# Patient Record
Sex: FEMALE | Race: WHITE | Employment: OTHER | URBAN - METROPOLITAN AREA
[De-identification: names, ages, dates, MRNs, and addresses within clinical notes are randomized per-mention and may not be internally consistent; named-entity substitution may affect disease eponyms.]

---

## 2017-03-24 ENCOUNTER — HOSPITAL ENCOUNTER (OUTPATIENT)
Dept: PREADMISSION TESTING | Age: 82
Discharge: HOME | End: 2017-03-24
Payer: MEDICARE

## 2017-03-24 ENCOUNTER — HOSPITAL ENCOUNTER (OUTPATIENT)
Dept: GENERAL RADIOLOGY | Age: 82
Discharge: HOME | End: 2017-03-24
Payer: MEDICARE

## 2017-03-24 DIAGNOSIS — Z01.818 PRE-OP EVALUATION: ICD-10-CM

## 2017-03-24 DIAGNOSIS — N28.89 OTHER SPECIFIED DISORDER OF KIDNEY AND URETER: ICD-10-CM

## 2017-03-24 LAB
ANION GAP BLD CALC-SCNC: 7 MMOL/L (ref 3–18)
APTT PPP: 27.7 SEC (ref 23–36.4)
BASOPHILS # BLD AUTO: 0 K/UL (ref 0–0.1)
BASOPHILS # BLD: 1 % (ref 0–2)
BUN SERPL-MCNC: 10 MG/DL (ref 7–18)
BUN/CREAT SERPL: 11 (ref 12–20)
CALCIUM SERPL-MCNC: 9.1 MG/DL (ref 8.5–10.1)
CHLORIDE SERPL-SCNC: 103 MMOL/L (ref 100–108)
CO2 SERPL-SCNC: 31 MMOL/L (ref 21–32)
CREAT SERPL-MCNC: 0.87 MG/DL (ref 0.6–1.3)
DIFFERENTIAL METHOD BLD: NORMAL
EOSINOPHIL # BLD: 0.1 K/UL (ref 0–0.4)
EOSINOPHIL NFR BLD: 1 % (ref 0–5)
ERYTHROCYTE [DISTWIDTH] IN BLOOD BY AUTOMATED COUNT: 12.4 % (ref 11.6–14.5)
GLUCOSE SERPL-MCNC: 89 MG/DL (ref 74–99)
HCT VFR BLD AUTO: 42.1 % (ref 35–45)
HGB BLD-MCNC: 14 G/DL (ref 12–16)
INR PPP: 1 (ref 0.8–1.2)
LYMPHOCYTES # BLD AUTO: 24 % (ref 21–52)
LYMPHOCYTES # BLD: 1.6 K/UL (ref 0.9–3.6)
MCH RBC QN AUTO: 30.6 PG (ref 24–34)
MCHC RBC AUTO-ENTMCNC: 33.3 G/DL (ref 31–37)
MCV RBC AUTO: 91.9 FL (ref 74–97)
MONOCYTES # BLD: 0.4 K/UL (ref 0.05–1.2)
MONOCYTES NFR BLD AUTO: 6 % (ref 3–10)
NEUTS SEG # BLD: 4.7 K/UL (ref 1.8–8)
NEUTS SEG NFR BLD AUTO: 68 % (ref 40–73)
PLATELET # BLD AUTO: 230 K/UL (ref 135–420)
PMV BLD AUTO: 10.1 FL (ref 9.2–11.8)
POTASSIUM SERPL-SCNC: 4.3 MMOL/L (ref 3.5–5.5)
PROTHROMBIN TIME: 12.9 SEC (ref 11.5–15.2)
RBC # BLD AUTO: 4.58 M/UL (ref 4.2–5.3)
SODIUM SERPL-SCNC: 141 MMOL/L (ref 136–145)
WBC # BLD AUTO: 6.8 K/UL (ref 4.6–13.2)

## 2017-03-24 PROCEDURE — 85025 COMPLETE CBC W/AUTO DIFF WBC: CPT | Performed by: UROLOGY

## 2017-03-24 PROCEDURE — 85610 PROTHROMBIN TIME: CPT | Performed by: UROLOGY

## 2017-03-24 PROCEDURE — 85730 THROMBOPLASTIN TIME PARTIAL: CPT | Performed by: UROLOGY

## 2017-03-24 PROCEDURE — 93005 ELECTROCARDIOGRAM TRACING: CPT

## 2017-03-24 PROCEDURE — 36415 COLL VENOUS BLD VENIPUNCTURE: CPT | Performed by: UROLOGY

## 2017-03-24 PROCEDURE — 80048 BASIC METABOLIC PNL TOTAL CA: CPT | Performed by: UROLOGY

## 2017-03-24 PROCEDURE — 71020 XR CHEST PA LAT: CPT

## 2017-03-25 LAB
ATRIAL RATE: 62 BPM
CALCULATED P AXIS, ECG09: 67 DEGREES
CALCULATED R AXIS, ECG10: 44 DEGREES
CALCULATED T AXIS, ECG11: 59 DEGREES
DIAGNOSIS, 93000: NORMAL
P-R INTERVAL, ECG05: 168 MS
Q-T INTERVAL, ECG07: 436 MS
QRS DURATION, ECG06: 86 MS
QTC CALCULATION (BEZET), ECG08: 442 MS
VENTRICULAR RATE, ECG03: 62 BPM

## 2017-03-30 RX ORDER — CIPROFLOXACIN 2 MG/ML
400 INJECTION, SOLUTION INTRAVENOUS ONCE
Status: CANCELLED | OUTPATIENT
Start: 2017-03-30 | End: 2017-03-30

## 2017-04-01 ENCOUNTER — ANESTHESIA EVENT (OUTPATIENT)
Dept: SURGERY | Age: 82
End: 2017-04-01
Payer: MEDICARE

## 2017-04-03 ENCOUNTER — ANESTHESIA (OUTPATIENT)
Dept: SURGERY | Age: 82
End: 2017-04-03
Payer: MEDICARE

## 2017-04-03 ENCOUNTER — APPOINTMENT (OUTPATIENT)
Dept: GENERAL RADIOLOGY | Age: 82
End: 2017-04-03
Attending: UROLOGY
Payer: MEDICARE

## 2017-04-03 ENCOUNTER — HOSPITAL ENCOUNTER (OUTPATIENT)
Age: 82
Setting detail: OUTPATIENT SURGERY
Discharge: HOME | End: 2017-04-03
Attending: UROLOGY | Admitting: UROLOGY
Payer: MEDICARE

## 2017-04-03 VITALS
HEART RATE: 75 BPM | WEIGHT: 150 LBS | DIASTOLIC BLOOD PRESSURE: 69 MMHG | OXYGEN SATURATION: 97 % | BODY MASS INDEX: 30.24 KG/M2 | SYSTOLIC BLOOD PRESSURE: 129 MMHG | TEMPERATURE: 96.9 F | RESPIRATION RATE: 14 BRPM | HEIGHT: 59 IN

## 2017-04-03 PROCEDURE — 88305 TISSUE EXAM BY PATHOLOGIST: CPT | Performed by: UROLOGY

## 2017-04-03 PROCEDURE — 77030020782 HC GWN BAIR PAWS FLX 3M -B: Performed by: UROLOGY

## 2017-04-03 PROCEDURE — 77030020269 HC MISC IMPL: Performed by: UROLOGY

## 2017-04-03 PROCEDURE — 77030010509 HC AIRWY LMA MSK TELE -A: Performed by: NURSE ANESTHETIST, CERTIFIED REGISTERED

## 2017-04-03 PROCEDURE — 77030012012 HC AIRWY OP SFT TELE -A: Performed by: NURSE ANESTHETIST, CERTIFIED REGISTERED

## 2017-04-03 PROCEDURE — C1894 INTRO/SHEATH, NON-LASER: HCPCS | Performed by: UROLOGY

## 2017-04-03 PROCEDURE — 74011250636 HC RX REV CODE- 250/636: Performed by: UROLOGY

## 2017-04-03 PROCEDURE — 82360 CALCULUS ASSAY QUANT: CPT | Performed by: UROLOGY

## 2017-04-03 PROCEDURE — C1769 GUIDE WIRE: HCPCS | Performed by: UROLOGY

## 2017-04-03 PROCEDURE — 74011250636 HC RX REV CODE- 250/636: Performed by: ANESTHESIOLOGY

## 2017-04-03 PROCEDURE — 74011250636 HC RX REV CODE- 250/636

## 2017-04-03 PROCEDURE — 77030032490 HC SLV COMPR SCD KNE COVD -B: Performed by: UROLOGY

## 2017-04-03 PROCEDURE — 77030025764 HC FCPS BIOP ENDOSC BSC -D: Performed by: UROLOGY

## 2017-04-03 PROCEDURE — 77030018846 HC SOL IRR STRL H20 ICUM -A: Performed by: UROLOGY

## 2017-04-03 PROCEDURE — 77030006974 HC BSKT URET RTVR BSC -C: Performed by: UROLOGY

## 2017-04-03 PROCEDURE — 76010000162 HC OR TIME 1.5 TO 2 HR INTENSV-TIER 1: Performed by: UROLOGY

## 2017-04-03 PROCEDURE — 76060000034 HC ANESTHESIA 1.5 TO 2 HR: Performed by: UROLOGY

## 2017-04-03 PROCEDURE — 74011250637 HC RX REV CODE- 250/637: Performed by: ANESTHESIOLOGY

## 2017-04-03 PROCEDURE — 76210000021 HC REC RM PH II 0.5 TO 1 HR: Performed by: UROLOGY

## 2017-04-03 PROCEDURE — 88300 SURGICAL PATH GROSS: CPT | Performed by: UROLOGY

## 2017-04-03 PROCEDURE — 74011000258 HC RX REV CODE- 258: Performed by: UROLOGY

## 2017-04-03 PROCEDURE — 74011000250 HC RX REV CODE- 250

## 2017-04-03 PROCEDURE — C1758 CATHETER, URETERAL: HCPCS | Performed by: UROLOGY

## 2017-04-03 PROCEDURE — 77030012961 HC IRR KT CYSTO/TUR ICUM -A: Performed by: UROLOGY

## 2017-04-03 PROCEDURE — 77030018836 HC SOL IRR NACL ICUM -A: Performed by: UROLOGY

## 2017-04-03 PROCEDURE — 76210000006 HC OR PH I REC 0.5 TO 1 HR: Performed by: UROLOGY

## 2017-04-03 PROCEDURE — 74011636320 HC RX REV CODE- 636/320: Performed by: UROLOGY

## 2017-04-03 PROCEDURE — 74420 UROGRAPHY RTRGR +-KUB: CPT

## 2017-04-03 RX ORDER — EPHEDRINE SULFATE/0.9% NACL/PF 25 MG/5 ML
SYRINGE (ML) INTRAVENOUS AS NEEDED
Status: DISCONTINUED | OUTPATIENT
Start: 2017-04-03 | End: 2017-04-03 | Stop reason: HOSPADM

## 2017-04-03 RX ORDER — SODIUM CHLORIDE 0.9 % (FLUSH) 0.9 %
5-10 SYRINGE (ML) INJECTION AS NEEDED
Status: DISCONTINUED | OUTPATIENT
Start: 2017-04-03 | End: 2017-04-03 | Stop reason: HOSPADM

## 2017-04-03 RX ORDER — FENTANYL CITRATE 50 UG/ML
INJECTION, SOLUTION INTRAMUSCULAR; INTRAVENOUS AS NEEDED
Status: DISCONTINUED | OUTPATIENT
Start: 2017-04-03 | End: 2017-04-03 | Stop reason: HOSPADM

## 2017-04-03 RX ORDER — FENTANYL CITRATE 50 UG/ML
25 INJECTION, SOLUTION INTRAMUSCULAR; INTRAVENOUS
Status: DISCONTINUED | OUTPATIENT
Start: 2017-04-03 | End: 2017-04-03 | Stop reason: HOSPADM

## 2017-04-03 RX ORDER — CIPROFLOXACIN 2 MG/ML
400 INJECTION, SOLUTION INTRAVENOUS ONCE
Status: COMPLETED | OUTPATIENT
Start: 2017-04-03 | End: 2017-04-03

## 2017-04-03 RX ORDER — HYDROMORPHONE HYDROCHLORIDE 2 MG/1
2 TABLET ORAL
Qty: 20 TAB | Refills: 0 | Status: SHIPPED | OUTPATIENT
Start: 2017-04-03 | End: 2017-04-18

## 2017-04-03 RX ORDER — LIDOCAINE HYDROCHLORIDE 20 MG/ML
INJECTION, SOLUTION EPIDURAL; INFILTRATION; INTRACAUDAL; PERINEURAL AS NEEDED
Status: DISCONTINUED | OUTPATIENT
Start: 2017-04-03 | End: 2017-04-03 | Stop reason: HOSPADM

## 2017-04-03 RX ORDER — CIPROFLOXACIN 2 MG/ML
400 INJECTION, SOLUTION INTRAVENOUS ONCE
Status: DISCONTINUED | OUTPATIENT
Start: 2017-04-03 | End: 2017-04-03 | Stop reason: HOSPADM

## 2017-04-03 RX ORDER — ONDANSETRON 2 MG/ML
INJECTION INTRAMUSCULAR; INTRAVENOUS AS NEEDED
Status: DISCONTINUED | OUTPATIENT
Start: 2017-04-03 | End: 2017-04-03 | Stop reason: HOSPADM

## 2017-04-03 RX ORDER — PROPOFOL 10 MG/ML
INJECTION, EMULSION INTRAVENOUS AS NEEDED
Status: DISCONTINUED | OUTPATIENT
Start: 2017-04-03 | End: 2017-04-03 | Stop reason: HOSPADM

## 2017-04-03 RX ORDER — FAMOTIDINE 20 MG/1
20 TABLET, FILM COATED ORAL ONCE
Status: COMPLETED | OUTPATIENT
Start: 2017-04-03 | End: 2017-04-03

## 2017-04-03 RX ORDER — SODIUM CHLORIDE, SODIUM LACTATE, POTASSIUM CHLORIDE, CALCIUM CHLORIDE 600; 310; 30; 20 MG/100ML; MG/100ML; MG/100ML; MG/100ML
75 INJECTION, SOLUTION INTRAVENOUS CONTINUOUS
Status: DISCONTINUED | OUTPATIENT
Start: 2017-04-03 | End: 2017-04-03 | Stop reason: HOSPADM

## 2017-04-03 RX ORDER — CIPROFLOXACIN 250 MG/1
250 TABLET, FILM COATED ORAL 2 TIMES DAILY
Qty: 6 TAB | Refills: 0 | Status: SHIPPED | OUTPATIENT
Start: 2017-04-03 | End: 2017-04-18

## 2017-04-03 RX ORDER — HYDROMORPHONE HYDROCHLORIDE 2 MG/1
1 TABLET ORAL
Status: DISCONTINUED | OUTPATIENT
Start: 2017-04-03 | End: 2017-04-03 | Stop reason: HOSPADM

## 2017-04-03 RX ADMIN — PROPOFOL 50 MG: 10 INJECTION, EMULSION INTRAVENOUS at 07:45

## 2017-04-03 RX ADMIN — GENTAMICIN SULFATE 160 MG: 40 INJECTION, SOLUTION INTRAMUSCULAR; INTRAVENOUS at 07:38

## 2017-04-03 RX ADMIN — PROPOFOL 150 MG: 10 INJECTION, EMULSION INTRAVENOUS at 07:43

## 2017-04-03 RX ADMIN — CIPROFLOXACIN 400 MG: 2 INJECTION, SOLUTION INTRAVENOUS at 07:59

## 2017-04-03 RX ADMIN — FENTANYL CITRATE 50 MCG: 50 INJECTION, SOLUTION INTRAMUSCULAR; INTRAVENOUS at 07:49

## 2017-04-03 RX ADMIN — SODIUM CHLORIDE, SODIUM LACTATE, POTASSIUM CHLORIDE, AND CALCIUM CHLORIDE 75 ML/HR: 600; 310; 30; 20 INJECTION, SOLUTION INTRAVENOUS at 06:40

## 2017-04-03 RX ADMIN — LIDOCAINE HYDROCHLORIDE 60 MG: 20 INJECTION, SOLUTION EPIDURAL; INFILTRATION; INTRACAUDAL; PERINEURAL at 07:43

## 2017-04-03 RX ADMIN — Medication 5 MG: at 08:47

## 2017-04-03 RX ADMIN — ONDANSETRON 4 MG: 2 INJECTION INTRAMUSCULAR; INTRAVENOUS at 07:50

## 2017-04-03 RX ADMIN — FAMOTIDINE 20 MG: 20 TABLET, FILM COATED ORAL at 06:50

## 2017-04-03 NOTE — DISCHARGE INSTRUCTIONS
Cystoscopy: What to Expect at [address] [facility]    A cystoscopy is a procedure that lets a doctor look inside of the bladder and the urethra. The urethra is the tube that carries urine from the bladder to outside the body. The doctor uses a thin, lighted tool called a cystoscope. Your bladder is filled with fluid. This stretches the bladder so that your doctor can look closely at the inside of your bladder. After the cystoscopy, your urethra may be sore at first, and it may burn when you urinate for the first few days after the procedure. You may feel the need to urinate more often, and your urine may be pink. These symptoms should get better in 1 or 2 days. You will probably be able to go back to most of your usual activities in 1 or 2 days. This care sheet gives you a general idea about how long it will take for you to recover. But each person recovers at a different pace. Follow the steps below to get better as quickly as possible. How can you care for yourself at home? Activity  · Rest when you feel tired. Getting enough sleep will help you recover. · Try to walk each day. Start by walking a little more than you did the day before. Bit by bit, increase the amount you walk. Walking boosts blood flow and helps prevent pneumonia and constipation. · Avoid strenuous activities, such as bicycle riding, jogging, weight lifting, or aerobic exercise, until your doctor says it is okay. · Ask your doctor when you can drive again. · Most people are able to return to work within 1 or 2 days after the procedure. · You may shower and take baths as usual.  · Ask your doctor when it is okay for you to have sex. Diet  · You can eat your normal diet. If your stomach is upset, try bland, low-fat foods like plain rice, broiled chicken, toast, and yogurt. · Drink plenty of fluids (unless your doctor tells you not to). Medicines  · Take pain medicines exactly as directed.   ¨ If the doctor gave you a prescription medicine for pain, take it as prescribed. ¨ If you are not taking a prescription pain medicine, ask your doctor if you can take an over-the-counter medicine. · If you think your pain medicine is making you sick to your stomach:  ¨ Take your medicine after meals (unless your doctor has told you not to). ¨ Ask your doctor for a different pain medicine. · If your doctor prescribed antibiotics, take them as directed. Do not stop taking them just because you feel better. You need to take the full course of antibiotics. Follow-up care is a key part of your treatment and safety. Be sure to make and go to all appointments, and call your doctor if you are having problems. It's also a good idea to know your test results and keep a list of the medicines you take. When should you call for help? Call 911 anytime you think you may need emergency care. For example, call if:  · You passed out (lost consciousness). · You have severe trouble breathing. · You have sudden chest pain and shortness of breath, or you cough up blood. · You have severe belly pain. Call your doctor now or seek immediate medical care if:  · You are sick to your stomach or cannot keep fluids down. · Your urine is still red or you see blood clots after you have urinated several times. · You have trouble passing urine or stool, especially if you have pain or swelling in your lower belly. · You have signs of a blood clot, such as:  ¨ Pain in your calf, back of the knee, thigh, or groin. ¨ Redness and swelling in your leg or groin. · You develop a fever or severe chills. · You have pain in your back just below your rib cage. This is called flank pain. Watch closely for changes in your health, and be sure to contact your doctor if:  · You have pain or burning when you urinate. A burning feeling is normal for a day or two after the test, but call if it does not get better.   · You have a frequent urge to urinate but can pass only small amounts of urine.  · Your urine is pink, red, or cloudy, or smells bad. It is normal for the urine to have a pinkish color for a few days after the test, but call if it does not get better. Where can you learn more? Go to http://jaci-nicole.info/. Enter V773 in the search box to learn more about \"Cystoscopy: What to Expect at Home. \"  Current as of: November 11, 2016  Content Version: 11.2  © 6082-7979 [employer]. Care instructions adapted under license by [employer] (which disclaims liability or warranty for this information). If you have questions about a medical condition or this instruction, always ask your healthcare professional. [address] any warranty or liability for your use of this information. Ureteral Stent Placement: What to Expect at [facility]  A ureteral (say \"you-REE-ter-ul\") stent is a thin, hollow tube that is placed in the ureter to help urine pass from the kidney into the bladder. Ureters are the tubes that connect the kidneys to the bladder. You may have a small amount of blood in your urine for 1 to 3 days after the procedure. While the stent is in place, you may have to urinate more often, feel a sudden need to urinate, or feel like you cannot completely empty your bladder. You may feel some pain when you urinate or do strenuous activity. You also may notice a small amount of blood in your urine after strenuous activities. These side effects usually do not prevent people from doing their normal daily activities. You may have a thin string coming out of your urethra. Your urethra is the tube that carries urine from your bladder to outside your body. This string is attached to the stent. Try not to pull on the string. The doctor will use the string to pull out the stent when you no longer need it. After the procedure, urine may flow better from your kidneys to your bladder.  A ureteral stent may be left in place for several days or for as long as several months. Your doctor will take it out when you no longer need it. This care sheet gives you a general idea about how long it will take for you to recover. But each person recovers at a different pace. Follow the steps below to get better as quickly as possible. How can you care for yourself at home? Activity  · Rest when you feel tired. Getting enough sleep will help you recover. · Avoid strenuous activities, such as bicycle riding, jogging, weight lifting, or aerobic exercise, until your doctor says it is okay. · Ask your doctor when you can drive again. · Most people are able to return to work the day after the procedure. If your work requires intense activity, you may feel pain in your kidney area or get tired easily. If this happens, you may need to do less strenuous activities while the stent is in. · Ask your doctor when it is okay for you to have sex. Diet  · You can eat your normal diet. If your stomach is upset, try bland, low-fat foods like plain rice, broiled chicken, toast, and yogurt. · Drink plenty of fluids (unless your doctor tells you not to). Medicines  · Your doctor will tell you if and when you can restart your medicines. He or she will also give you instructions about taking any new medicines. · If you take blood thinners, such as warfarin (Coumadin), clopidogrel (Plavix), or aspirin, be sure to talk to your doctor. He or she will tell you if and when to start taking those medicines again. Make sure that you understand exactly what your doctor wants you to do. · Be safe with medicines. Take pain medicines exactly as directed. ¨ If the doctor gave you a prescription medicine for pain, take it as prescribed. ¨ If you are not taking a prescription pain medicine, ask your doctor if you can take an over-the-counter medicine.   · If you think your pain medicine is making you sick to your stomach:  ¨ Take your medicine after meals (unless your doctor has told you not to). ¨ Ask your doctor for a different pain medicine. · If your doctor prescribed antibiotics, take them as directed. Do not stop taking them just because you feel better. You need to take the full course of antibiotics. Follow-up care is a key part of your treatment and safety. Be sure to make and go to all appointments, and call your doctor if you are having problems. It's also a good idea to know your test results and keep a list of the medicines you take. When should you call for help? Call 911 anytime you think you may need emergency care. For example, call if:  · You passed out (lost consciousness). · You have severe trouble breathing. · You have sudden chest pain and shortness of breath, or you cough up blood. · You have severe belly pain. Call your doctor now or seek immediate medical care if:  · Part or all of the stent comes out of your urethra. · You have pain that does not get better after you take pain medicine. · You have symptoms of a urinary infection. For example:  ¨ You have blood or pus in your urine. ¨ You have pain in your back just below your rib cage. This is called flank pain. ¨ You have a fever, chills, or body aches. ¨ It hurts to urinate. ¨ You have groin or belly pain. · You cannot control when you urinate, or you leak urine. Watch closely for changes in your health, and be sure to contact your doctor if you have any problems. Where can you learn more? Go to http://jaci-nicole.info/. Enter G977 in the search box to learn more about \"Ureteral Stent Placement: What to Expect at Home. \"  Current as of: August 12, 2016  Content Version: 11.2  © 1528-5255 [employer]. Care instructions adapted under license by [employer] (which disclaims liability or warranty for this information).  If you have questions about a medical condition or this instruction, always ask your healthcare professional. [employer] disclaims any warranty or liability for your use of this information. Ciprofloxacin (By mouth)   Ciprofloxacin (rvl-tsp-LLUB-a-sin)  Treats infections. This medicine is a quinolone antibiotic. Brand Name(s):Cipro   There may be other brand names for this medicine. When This Medicine Should Not Be Used: This medicine is not right for everyone. Do not use if you had an allergic reaction to ciprofloxacin or similar medicines. How to Use This Medicine:   Liquid, Tablet, Long Acting Tablet  · Take this medicine as directed, and take it at the same time each day. · You may take this medicine with or without food. Do not take this medicine with only a source of calcium, such as milk, yogurt, or juice that contains added calcium. You may have foods or drinks that contain calcium as part of a larger meal.  · Swallow the tablet whole. Do not break, crush, or chew it. · Oral liquid: Shake for at least 15 seconds just before each use. The liquid has small beads floating in it. Do not chew the beads when you drink the liquid. Measure the oral liquid medicine with a marked measuring spoon, oral syringe, or medicine cup. · Drink extra fluids so you will urinate more often and help prevent kidney problems. · Take all of the medicine in your prescription to clear up your infection, even if you feel better after the first few doses. · This medicine should come with a Medication Guide. Ask your pharmacist for a copy if you do not have one. · Missed dose: Take a dose as soon as you remember. If it is almost time for your next dose, wait until then and take a regular dose. Do not take extra medicine to make up for a missed dose. · Store the medicine in a closed container at room temperature, away from heat, moisture, and direct light. Throw away any leftover liquid medicine after 14 days.   Drugs and Foods to Avoid:   Ask your doctor or pharmacist before using any other medicine, including over-the-counter medicines, vitamins, and herbal products. · Do not use this medicine together with tizanidine. · Some medicines and foods can affect how ciprofloxacin works. Tell your doctor if you are using theophylline or a steroid medicine (such as hydrocortisone, methylprednisolone, prednisone). · Tell your doctor if you are using clozapine, cyclosporine, duloxetine, lidocaine, methotrexate, olanzapine, pentoxifylline, phenytoin, probenecid, ropinirole, sildenafil, a blood thinner (such as warfarin), a diabetes medicine (such as glyburide), medicine for depression, medicine for mental illness, other medicine to treat an infection, NSAID pain medicine (such as aspirin, diclofenac, ibuprofen, naproxen, celecoxib), or medicine for heart rhythm problems (such as quinidine, procainamide, amiodarone, sotalol). · Some minerals and medicines can keep your body from absorbing this medicine. You may need to take ciprofloxacin at least 2 hours before or 6 hours after you take these medicines. These include magnesium, aluminum, calcium, zinc, iron, lanthanum, sevelamer, sucralfate, and didanosine. Ask your pharmacist for more information. · This medicine slows the digestion of caffeine, so it might affect you for longer than normal.  Warnings While Using This Medicine:   · Tell your doctor if you are pregnant or breastfeeding, or if you have kidney disease, liver disease, diabetes, heart disease, myasthenia gravis, or a history of heart rhythm problems (such as prolonged QT interval), seizures, or stroke. Tell your doctor if you have ever had tendon or joint problems, including rheumatoid arthritis, or if you have received a transplant. · This medicine may cause the following problems:  ¨ Tendinitis and tendon rupture (may happen after treatment ends)  ¨ Liver damage  ¨ Nerve damage in the arms or legs  ¨ Severe diarrhea  ¨ Heart rhythm changes  · This medicine may make you dizzy, drowsy, or lightheaded.  Do not drive or do anything that could be dangerous until you know how this medicine affects you. · This medicine can cause diarrhea. Call your doctor if the diarrhea becomes severe, does not stop, or is bloody. Do not take any medicine to stop diarrhea until you have talked to your doctor. Diarrhea can occur 2 months or more after you stop taking this medicine. · This medicine may make your skin more sensitive to sunlight. Wear sunscreen. Do not use sunlamps or tanning beds. · Call your doctor if your symptoms do not improve or if they get worse. · Keep all medicine out of the reach of children. Never share your medicine with anyone. Possible Side Effects While Using This Medicine:   Call your doctor right away if you notice any of these side effects:  · Allergic reaction: Itching or hives, swelling in your face or hands, swelling or tingling in your mouth or throat, chest tightness, trouble breathing  · Blistering, peeling, or red skin rash  · Diarrhea that may contain blood  · Fainting, dizziness, or lightheadedness  · Fast, slow, or uneven heartbeat  · Numbness, tingling, weakness, or burning pain in your hands, arms, legs, or feet  · Pain, stiffness, swelling, or bruises around your ankle, leg, shoulder, or other joint  · Seizures, severe headache, unusual thoughts or behaviors, trouble sleeping, confusion  · Unusual bleeding, bruising, or weakness  · Yellow skin or eyes  If you notice other side effects that you think are caused by this medicine, tell your doctor. Call your doctor for medical advice about side effects. You may report side effects to FDA at 7-187-UPO-1914  © 2016 [address] is for End User's use only and may not be sold, redistributed or otherwise used for commercial purposes. The above information is an  only. It is not intended as medical advice for individual conditions or treatments.  Talk to your doctor, nurse or pharmacist before following any medical regimen to see if it is safe and effective for you. Hydromorphone (By mouth)   Hydromorphone (ozf-hksi-BGH-fone)  Treats moderate to severe pain. This medicine is a narcotic pain reliever. Brand Name(s):Dilaudid, Exalgo   There may be other brand names for this medicine. When This Medicine Should Not Be Used: This medicine is not right for everyone. Do not use it if you had an allergic reaction to hydromorphone or sulfites, or if you have severe lung or breathing problems, paralytic ileus, or stomach or bowel blockage or narrowing. How to Use This Medicine:   Long Acting Capsule, Liquid, Tablet, Long Acting Tablet  · Take your medicine as directed. Your dose may need to be changed several times to find what works best for you. An overdose can be dangerous. Follow directions carefully so you do not get too much medicine at one time. · Measure the oral liquid medicine with a marked measuring spoon, oral syringe, or medicine cup. · Swallow the extended-release capsule whole. Do not crush, break, or chew it. · Swallow the extended-release tablet whole. Do not crush, break, or chew it. · If you take the extended-release tablet, part of the tablet may pass into your stools. This is normal and is nothing to worry about. · If you get any of the liquid medicine on your skin, rinse it with cool water right away. · Hydromorphone extended-release capsules or tablets work differently than regular hydromorphone tablets, even at the same dose. Do not switch from one form to another unless your doctor tells you to. · This medicine should come with a Medication Guide. Ask your pharmacist for a copy if you do not have one. · Missed dose:   ¨ Extended-release capsules or tablets: If you miss a dose, skip the missed dose and take your next dose at the usual time the next day. Do not double doses. ¨ Liquid: Take a dose as soon as you remember. If it is almost time for your next dose, wait until then and take a regular dose.  Do not take extra medicine to make up for a missed dose. · Store the medicine in a closed container at room temperature, away from heat, moisture, and direct light. Store the medicine in a safe and secure place. Do not throw unused medicine in the trash. Ask your pharmacist about the best way to dispose of medicine you do not use. Drugs and Foods to Avoid:   Ask your doctor or pharmacist before using any other medicine, including over-the-counter medicines, vitamins, and herbal products. · Some medicines can affect how hydromorphone works. Tell your doctor if you are using any of the following:   ¨ A phenothiazine medicine  ¨ An MAO inhibitor (MAOI) within the past 14 days  · Tell your doctor if you use anything else that makes you sleepy. Some examples are allergy medicine, narcotic pain medicine, and alcohol. Tell your doctor if you are also using buprenorphine, butorphanol, nalbuphine, pentazocine, or a muscle relaxer. · Do not drink alcohol while you are using this medicine. Warnings While Using This Medicine:   · Tell your doctor if you are pregnant or breastfeeding, or if you have kidney disease, liver disease, lung disease or breathing problems (such as asthma or COPD), low blood pressure, an underactive thyroid, Ashmore disease, cystic fibrosis, pancreas problems, gallbladder problems, an enlarged prostate, trouble urinating, or stomach or bowel problems. Tell your doctor if you have a history of head injury, brain tumor, seizures, depression, or alcohol or drug abuse. · This medicine may cause the following problems:  ¨ High risk of overdose, which can lead to death  ¨ Respiratory depression (serious breathing problem that can be life-threatening)  · This medicine can be habit-forming. Do not use more than your prescribed dose. Call your doctor if you think your medicine is not working. · Do not stop using this medicine suddenly. Your doctor will need to slowly decrease your dose before you stop it completely.   · This medicine may make you dizzy, drowsy, or lightheaded. Do not drive or do anything else that could be dangerous until you know how this medicine affects you. Sit or lie down if you feel dizzy. Stand up carefully. · This medicine may cause constipation, especially with long-term use. Ask your doctor if you should use a laxative to prevent and treat constipation. · Keep all medicine out of the reach of children. Never share your medicine with anyone. Possible Side Effects While Using This Medicine:   Call your doctor right away if you notice any of these side effects:  · Allergic reaction: Itching or hives, swelling in your face or hands, swelling or tingling in your mouth or throat, chest tightness, trouble breathing  · Blue lips, fingernails, or skin  · Extreme dizziness or weakness, shallow breathing, slow or uneven heartbeat, sweating, cold or clammy skin, seizures  · Severe confusion, lightheadedness, dizziness, fainting  · Severe constipation, stomach pain, or vomiting  · Trouble breathing or slow breathing  If you notice these less serious side effects, talk with your doctor:   · Mild constipation, nausea, or vomiting  · Mild sleepiness or tiredness  If you notice other side effects that you think are caused by this medicine, tell your doctor. Call your doctor for medical advice about side effects. You may report side effects to FDA at 9-902-FDA-6890  © 2016 [address] is for End User's use only and may not be sold, redistributed or otherwise used for commercial purposes. The above information is an  only. It is not intended as medical advice for individual conditions or treatments. Talk to your doctor, nurse or pharmacist before following any medical regimen to see if it is safe and effective for you.     DISCHARGE SUMMARY from Nurse    The following personal items are in your possession at time of discharge:    Dental Appliances: None  Visual Aid: Glasses Home Medications: None  Jewelry: None  Clothing: Pants, Shirt, Socks, Undergarments, Footwear  Other Valuables: None   PATIENT INSTRUCTIONS:    After general anesthesia or intravenous sedation, for 24 hours or while taking prescription Narcotics:  · Limit your activities  · Do not drive and operate hazardous machinery  · Do not make important personal or business decisions  · Do  not drink alcoholic beverages  · If you have not urinated within 8 hours after discharge, please contact your surgeon on call. Report the following to your surgeon:  · Excessive pain, swelling, redness or odor of or around the surgical area  · Temperature over 100.5  · Nausea and vomiting lasting longer than 4 hours or if unable to take medications  · Any signs of decreased circulation or nerve impairment to extremity: change in color, persistent  numbness, tingling, coldness or increase pain  · Any questions      *  Please give a list of your current medications to your Primary Care Provider. *  Please update this list whenever your medications are discontinued, doses are      changed, or new medications (including over-the-counter products) are added. *  Please carry medication information at all times in case of emergency situations. These are general instructions for a healthy lifestyle:    No smoking/ No tobacco products/ Avoid exposure to second hand smoke    Surgeon General's Warning:  Quitting smoking now greatly reduces serious risk to your health.     Obesity, smoking, and sedentary lifestyle greatly increases your risk for illness    A healthy diet, regular physical exercise & weight monitoring are important for maintaining a healthy lifestyle    You may be retaining fluid if you have a history of heart failure or if you experience any of the following symptoms:  Weight gain of 3 pounds or more overnight or 5 pounds in a week, increased swelling in our hands or feet or shortness of breath while lying flat in bed.  Please call your doctor as soon as you notice any of these symptoms; do not wait until your next office visit. Recognize signs and symptoms of STROKE:    F-face looks uneven    A-arms unable to move or move unevenly    S-speech slurred or non-existent    T-time-call 911 as soon as signs and symptoms begin-DO NOT go       Back to bed or wait to see if you get better-TIME IS BRAIN. Warning Signs of HEART ATTACK     Call 911 if you have these symptoms:   Chest discomfort. Most heart attacks involve discomfort in the center of the chest that lasts more than a few minutes, or that goes away and comes back. It can feel like uncomfortable pressure, squeezing, fullness, or pain.  Discomfort in other areas of the upper body. Symptoms can include pain or discomfort in one or both arms, the back, neck, jaw, or stomach.  Shortness of breath with or without chest discomfort.  Other signs may include breaking out in a cold sweat, nausea, or lightheadedness. Don't wait more than five minutes to call 911 - MINUTES MATTER! Fast action can save your life. Calling 911 is almost always the fastest way to get lifesaving treatment. Emergency Medical Services staff can begin treatment when they arrive -- up to an hour sooner than if someone gets to the hospital by car. The discharge information has been reviewed with the patient and family. The patient and family verbalized understanding. Discharge medications reviewed with the patient and family and appropriate educational materials and side effects teaching were provided.

## 2017-04-03 NOTE — ANESTHESIA PREPROCEDURE EVALUATION
Anesthetic History   No history of anesthetic complications            Review of Systems / Medical History  Patient summary reviewed and pertinent labs reviewed    Pulmonary  Within defined limits                 Neuro/Psych   Within defined limits           Cardiovascular  Within defined limits  Hypertension              Exercise tolerance: >4 METS     GI/Hepatic/Renal  Within defined limits              Endo/Other  Within defined limits           Other Findings   Comments:   Risk Factors for Postoperative nausea/vomiting:       History of postoperative nausea/vomiting? NO       Female? YES       Motion sickness? NO       Intended opioid administration for postoperative analgesia?   YES           Physical Exam    Airway  Mallampati: III  TM Distance: 4 - 6 cm  Neck ROM: normal range of motion   Mouth opening: Diminished (comment)    Comments: Small mouth opening Cardiovascular  Regular rate and rhythm,  S1 and S2 normal,  no murmur, click, rub, or gallop  Rhythm: regular  Rate: normal         Dental    Dentition: Bridges and Poor dentition     Pulmonary  Breath sounds clear to auscultation               Abdominal  GI exam deferred       Other Findings            Anesthetic Plan    ASA: 3  Anesthesia type: general          Induction: Intravenous  Anesthetic plan and risks discussed with: Patient

## 2017-04-03 NOTE — INTERVAL H&P NOTE
H&P Update:  Corbin Mann was seen and examined. History and physical has been reviewed. The patient has been examined.  There have been no significant clinical changes since the completion of the originally dated History and Physical.    Signed By: Georgette Lazo MD     April 3, 2017 7:36 AM

## 2017-04-03 NOTE — PROGRESS NOTES
conducted a pre-surgery visit with Merlin Majestic, who is a 80 y.o.,female. The  provided the following Interventions:  Initiated a relationship of care and support. Offered prayer and assurance of continued prayers on patient's behalf. Plan:  Chaplains will continue to follow and will provide pastoral care on an as needed/requested basis.  recommends bedside caregivers page  on duty if patient shows signs of acute spiritual or emotional distress.     [id number] [facility] Certified [address] [place] [address]   [phone]

## 2017-04-03 NOTE — IP AVS SNAPSHOT
[address] [address] 
[phone] Patient: Gerda Fishman MRN: [id number]:[id number] You are allergic to the following Allergen Reactions Codeine Unable to Obtain Other Plant, Animal, Environmental Itching Adhesisve - and irritates the skin. Recent Documentation Height Weight BMI Smoking Status 1.499 m 68 kg 30.3 kg/m2 Never Smoker Emergency Contacts Name Discharge Info Relation Home Work Mobile Simin Hernandez DISCHARGE CAREGIVER [3] Child [2] [phone] Tami Velarde DISCHARGE CAREGIVER [3] Other Relative [6] [phone] Calin Liu  Child [2] [phone] About your hospitalization You were admitted on:  April 3, 2017 You last received care in the:  [facility] You were discharged on:  April 3, 2017 Unit phone number:  [phone] Why you were hospitalized Your primary diagnosis was:  Not on File Providers Seen During Your Hospitalizations Provider Role Specialty Primary office phone Raine Holt MD Attending Provider Urology [phone] Your Primary Care Physician (PCP) Primary Care Physician Office Phone Office Fax Regi Henry [phone] Follow-up Information Follow up With Details Comments Contact Info Sharon Daniel MD   [address] [address] 
[phone] Raine Holt MD Call today Arrange a 2 week follow up. Or go as previously scheduled. [address] [place] [address] [address] 
[phone] Current Discharge Medication List  
  
START taking these medications Dose & Instructions Dispensing Information Comments Morning Noon Evening Bedtime  
 ciprofloxacin HCl 250 mg tablet Commonly known as:  CIPRO Your last dose was: Your next dose is: Dose:  250 mg Take 1 Tab by mouth two (2) times a day. Quantity:  6 Tab Refills:  0 HYDROmorphone 2 mg tablet Commonly known as:  DILAUDID Your last dose was: Your next dose is:    
   
   
 Dose:  2 mg Take 1 Tab by mouth every four (4) hours as needed for Pain. Max Daily Amount: 12 mg. Quantity:  20 Tab Refills:  0 CONTINUE these medications which have NOT CHANGED Dose & Instructions Dispensing Information Comments Morning Noon Evening Bedtime  
 atorvastatin 10 mg tablet Commonly known as:  LIPITOR Your last dose was: Your next dose is:    
   
   
 daily. Refills:  3  
     
   
   
   
  
 lisinopril 20 mg tablet Commonly known as:  Trena Barrier Your last dose was: Your next dose is:    
   
   
 daily. Refills:  4 PRESERVISION AREDS 2 PO Your last dose was: Your next dose is: Take  by mouth daily. Refills:  0 PROLENSA 0.07 % ophthalmic solution Generic drug:  bromfenac Your last dose was: Your next dose is:    
   
   
 Dose:  1 Drop Administer 1 Drop to right eye daily. Refills:  0  
     
   
   
   
  
 verapamil  mg CR tablet Commonly known as:  CALAN-SR Your last dose was: Your next dose is:    
   
   
 two (2) times a day. Refills:  4 Where to Get Your Medications Information on where to get these meds will be given to you by the nurse or doctor. ! Ask your nurse or doctor about these medications  
  ciprofloxacin HCl 250 mg tablet HYDROmorphone 2 mg tablet Discharge Instructions Cystoscopy: What to Expect at [facility] Your Recovery A cystoscopy is a procedure that lets a doctor look inside of the bladder and the urethra.  The urethra is the tube that carries urine from the bladder to outside the body. The doctor uses a thin, lighted tool called a cystoscope. Your bladder is filled with fluid. This stretches the bladder so that your doctor can look closely at the inside of your bladder. After the cystoscopy, your urethra may be sore at first, and it may burn when you urinate for the first few days after the procedure. You may feel the need to urinate more often, and your urine may be pink. These symptoms should get better in 1 or 2 days. You will probably be able to go back to most of your usual activities in 1 or 2 days. This care sheet gives you a general idea about how long it will take for you to recover. But each person recovers at a different pace. Follow the steps below to get better as quickly as possible. How can you care for yourself at home? Activity · Rest when you feel tired. Getting enough sleep will help you recover. · Try to walk each day. Start by walking a little more than you did the day before. Bit by bit, increase the amount you walk. Walking boosts blood flow and helps prevent pneumonia and constipation. · Avoid strenuous activities, such as bicycle riding, jogging, weight lifting, or aerobic exercise, until your doctor says it is okay. · Ask your doctor when you can drive again. · Most people are able to return to work within 1 or 2 days after the procedure. · You may shower and take baths as usual. 
· Ask your doctor when it is okay for you to have sex. Diet · You can eat your normal diet. If your stomach is upset, try bland, low-fat foods like plain rice, broiled chicken, toast, and yogurt. · Drink plenty of fluids (unless your doctor tells you not to). Medicines · Take pain medicines exactly as directed. ¨ If the doctor gave you a prescription medicine for pain, take it as prescribed. ¨ If you are not taking a prescription pain medicine, ask your doctor if you can take an over-the-counter medicine. · If you think your pain medicine is making you sick to your stomach: 
¨ Take your medicine after meals (unless your doctor has told you not to). ¨ Ask your doctor for a different pain medicine. · If your doctor prescribed antibiotics, take them as directed. Do not stop taking them just because you feel better. You need to take the full course of antibiotics. Follow-up care is a key part of your treatment and safety. Be sure to make and go to all appointments, and call your doctor if you are having problems. It's also a good idea to know your test results and keep a list of the medicines you take. When should you call for help? Call 911 anytime you think you may need emergency care. For example, call if: 
· You passed out (lost consciousness). · You have severe trouble breathing. · You have sudden chest pain and shortness of breath, or you cough up blood. · You have severe belly pain. Call your doctor now or seek immediate medical care if: 
· You are sick to your stomach or cannot keep fluids down. · Your urine is still red or you see blood clots after you have urinated several times. · You have trouble passing urine or stool, especially if you have pain or swelling in your lower belly. · You have signs of a blood clot, such as: 
¨ Pain in your calf, back of the knee, thigh, or groin. ¨ Redness and swelling in your leg or groin. · You develop a fever or severe chills. · You have pain in your back just below your rib cage. This is called flank pain. Watch closely for changes in your health, and be sure to contact your doctor if: 
· You have pain or burning when you urinate. A burning feeling is normal for a day or two after the test, but call if it does not get better. · You have a frequent urge to urinate but can pass only small amounts of urine. · Your urine is pink, red, or cloudy, or smells bad.  It is normal for the urine to have a pinkish color for a few days after the test, but call if it does not get better. Where can you learn more? Go to http://jaci-nicole.info/. Enter B407 in the search box to learn more about \"Cystoscopy: What to Expect at Home. \" Current as of: November 11, 2016 Content Version: 11.2 © 6686-3424 [employer]. Care instructions adapted under license by [employer] (which disclaims liability or warranty for this information). If you have questions about a medical condition or this instruction, always ask your healthcare professional. [address] any warranty or liability for your use of this information. Ureteral Stent Placement: What to Expect at Home Your Recovery A ureteral (say \"you-REE-ter-ul\") stent is a thin, hollow tube that is placed in the ureter to help urine pass from the kidney into the bladder. Ureters are the tubes that connect the kidneys to the bladder. You may have a small amount of blood in your urine for 1 to 3 days after the procedure. While the stent is in place, you may have to urinate more often, feel a sudden need to urinate, or feel like you cannot completely empty your bladder. You may feel some pain when you urinate or do strenuous activity. You also may notice a small amount of blood in your urine after strenuous activities. These side effects usually do not prevent people from doing their normal daily activities. You may have a thin string coming out of your urethra. Your urethra is the tube that carries urine from your bladder to outside your body. This string is attached to the stent. Try not to pull on the string. The doctor will use the string to pull out the stent when you no longer need it. After the procedure, urine may flow better from your kidneys to your bladder. A ureteral stent may be left in place for several days or for as long as several months. Your doctor will take it out when you no longer need it. This care sheet gives you a general idea about how long it will take for you to recover. But each person recovers at a different pace. Follow the steps below to get better as quickly as possible. How can you care for yourself at home? Activity · Rest when you feel tired. Getting enough sleep will help you recover. · Avoid strenuous activities, such as bicycle riding, jogging, weight lifting, or aerobic exercise, until your doctor says it is okay. · Ask your doctor when you can drive again. · Most people are able to return to work the day after the procedure. If your work requires intense activity, you may feel pain in your kidney area or get tired easily. If this happens, you may need to do less strenuous activities while the stent is in. · Ask your doctor when it is okay for you to have sex. Diet · You can eat your normal diet. If your stomach is upset, try bland, low-fat foods like plain rice, broiled chicken, toast, and yogurt. · Drink plenty of fluids (unless your doctor tells you not to). Medicines · Your doctor will tell you if and when you can restart your medicines. He or she will also give you instructions about taking any new medicines. · If you take blood thinners, such as warfarin (Coumadin), clopidogrel (Plavix), or aspirin, be sure to talk to your doctor. He or she will tell you if and when to start taking those medicines again. Make sure that you understand exactly what your doctor wants you to do. · Be safe with medicines. Take pain medicines exactly as directed. ¨ If the doctor gave you a prescription medicine for pain, take it as prescribed. ¨ If you are not taking a prescription pain medicine, ask your doctor if you can take an over-the-counter medicine. · If you think your pain medicine is making you sick to your stomach: 
¨ Take your medicine after meals (unless your doctor has told you not to). ¨ Ask your doctor for a different pain medicine. · If your doctor prescribed antibiotics, take them as directed. Do not stop taking them just because you feel better. You need to take the full course of antibiotics. Follow-up care is a key part of your treatment and safety. Be sure to make and go to all appointments, and call your doctor if you are having problems. It's also a good idea to know your test results and keep a list of the medicines you take. When should you call for help? Call 911 anytime you think you may need emergency care. For example, call if: 
· You passed out (lost consciousness). · You have severe trouble breathing. · You have sudden chest pain and shortness of breath, or you cough up blood. · You have severe belly pain. Call your doctor now or seek immediate medical care if: · Part or all of the stent comes out of your urethra. · You have pain that does not get better after you take pain medicine. · You have symptoms of a urinary infection. For example: ¨ You have blood or pus in your urine. ¨ You have pain in your back just below your rib cage. This is called flank pain. ¨ You have a fever, chills, or body aches. ¨ It hurts to urinate. ¨ You have groin or belly pain. · You cannot control when you urinate, or you leak urine. Watch closely for changes in your health, and be sure to contact your doctor if you have any problems. Where can you learn more? Go to http://jaci-nicole.info/. Enter A679 in the search box to learn more about \"Ureteral Stent Placement: What to Expect at Home. \" Current as of: August 12, 2016 Content Version: 11.2 © 6041-6270 [employer]. Care instructions adapted under license by [employer] (which disclaims liability or warranty for this information). If you have questions about a medical condition or this instruction, always ask your healthcare professional. [address] any warranty or liability for your use of this information. Ciprofloxacin (By mouth) Ciprofloxacin (hoz-cio-GTSX-a-sin) Treats infections. This medicine is a quinolone antibiotic. Brand Name(s):Cipro There may be other brand names for this medicine. When This Medicine Should Not Be Used: This medicine is not right for everyone. Do not use if you had an allergic reaction to ciprofloxacin or similar medicines. How to Use This Medicine:  
Liquid, Tablet, Long Acting Tablet · Take this medicine as directed, and take it at the same time each day. · You may take this medicine with or without food. Do not take this medicine with only a source of calcium, such as milk, yogurt, or juice that contains added calcium. You may have foods or drinks that contain calcium as part of a larger meal. 
· Swallow the tablet whole. Do not break, crush, or chew it. · Oral liquid: Shake for at least 15 seconds just before each use. The liquid has small beads floating in it. Do not chew the beads when you drink the liquid. Measure the oral liquid medicine with a marked measuring spoon, oral syringe, or medicine cup. · Drink extra fluids so you will urinate more often and help prevent kidney problems. · Take all of the medicine in your prescription to clear up your infection, even if you feel better after the first few doses. · This medicine should come with a Medication Guide. Ask your pharmacist for a copy if you do not have one. · Missed dose: Take a dose as soon as you remember. If it is almost time for your next dose, wait until then and take a regular dose. Do not take extra medicine to make up for a missed dose. · Store the medicine in a closed container at room temperature, away from heat, moisture, and direct light. Throw away any leftover liquid medicine after 14 days. Drugs and Foods to Avoid: Ask your doctor or pharmacist before using any other medicine, including over-the-counter medicines, vitamins, and herbal products. · Do not use this medicine together with tizanidine. · Some medicines and foods can affect how ciprofloxacin works. Tell your doctor if you are using theophylline or a steroid medicine (such as hydrocortisone, methylprednisolone, prednisone). · Tell your doctor if you are using clozapine, cyclosporine, duloxetine, lidocaine, methotrexate, olanzapine, pentoxifylline, phenytoin, probenecid, ropinirole, sildenafil, a blood thinner (such as warfarin), a diabetes medicine (such as glyburide), medicine for depression, medicine for mental illness, other medicine to treat an infection, NSAID pain medicine (such as aspirin, diclofenac, ibuprofen, naproxen, celecoxib), or medicine for heart rhythm problems (such as quinidine, procainamide, amiodarone, sotalol). · Some minerals and medicines can keep your body from absorbing this medicine. You may need to take ciprofloxacin at least 2 hours before or 6 hours after you take these medicines. These include magnesium, aluminum, calcium, zinc, iron, lanthanum, sevelamer, sucralfate, and didanosine. Ask your pharmacist for more information. · This medicine slows the digestion of caffeine, so it might affect you for longer than normal. 
Warnings While Using This Medicine: · Tell your doctor if you are pregnant or breastfeeding, or if you have kidney disease, liver disease, diabetes, heart disease, myasthenia gravis, or a history of heart rhythm problems (such as prolonged QT interval), seizures, or stroke. Tell your doctor if you have ever had tendon or joint problems, including rheumatoid arthritis, or if you have received a transplant. · This medicine may cause the following problems: 
¨ Tendinitis and tendon rupture (may happen after treatment ends) ¨ Liver damage ¨ Nerve damage in the arms or legs ¨ Severe diarrhea 
¨ Heart rhythm changes · This medicine may make you dizzy, drowsy, or lightheaded.  Do not drive or do anything that could be dangerous until you know how this medicine affects you. · This medicine can cause diarrhea. Call your doctor if the diarrhea becomes severe, does not stop, or is bloody. Do not take any medicine to stop diarrhea until you have talked to your doctor. Diarrhea can occur 2 months or more after you stop taking this medicine. · This medicine may make your skin more sensitive to sunlight. Wear sunscreen. Do not use sunlamps or tanning beds. · Call your doctor if your symptoms do not improve or if they get worse. · Keep all medicine out of the reach of children. Never share your medicine with anyone. Possible Side Effects While Using This Medicine:  
Call your doctor right away if you notice any of these side effects: · Allergic reaction: Itching or hives, swelling in your face or hands, swelling or tingling in your mouth or throat, chest tightness, trouble breathing · Blistering, peeling, or red skin rash · Diarrhea that may contain blood · Fainting, dizziness, or lightheadedness · Fast, slow, or uneven heartbeat · Numbness, tingling, weakness, or burning pain in your hands, arms, legs, or feet · Pain, stiffness, swelling, or bruises around your ankle, leg, shoulder, or other joint · Seizures, severe headache, unusual thoughts or behaviors, trouble sleeping, confusion · Unusual bleeding, bruising, or weakness · Yellow skin or eyes If you notice other side effects that you think are caused by this medicine, tell your doctor. Call your doctor for medical advice about side effects. You may report side effects to FDA at 8-813-MIP-9379 © 2016 [address] is for End User's use only and may not be sold, redistributed or otherwise used for commercial purposes. The above information is an  only. It is not intended as medical advice for individual conditions or treatments.  Talk to your doctor, nurse or pharmacist before following any medical regimen to see if it is safe and effective for you. Hydromorphone (By mouth) Hydromorphone (sio-hhab-WVK-fone) Treats moderate to severe pain. This medicine is a narcotic pain reliever. Brand Name(s):Juvenal Cyrus Yusuf There may be other brand names for this medicine. When This Medicine Should Not Be Used: This medicine is not right for everyone. Do not use it if you had an allergic reaction to hydromorphone or sulfites, or if you have severe lung or breathing problems, paralytic ileus, or stomach or bowel blockage or narrowing. How to Use This Medicine:  
Long Acting Capsule, Liquid, Tablet, Long Acting Tablet · Take your medicine as directed. Your dose may need to be changed several times to find what works best for you. An overdose can be dangerous. Follow directions carefully so you do not get too much medicine at one time. · Measure the oral liquid medicine with a marked measuring spoon, oral syringe, or medicine cup. · Swallow the extended-release capsule whole. Do not crush, break, or chew it. · Swallow the extended-release tablet whole. Do not crush, break, or chew it. · If you take the extended-release tablet, part of the tablet may pass into your stools. This is normal and is nothing to worry about. · If you get any of the liquid medicine on your skin, rinse it with cool water right away. · Hydromorphone extended-release capsules or tablets work differently than regular hydromorphone tablets, even at the same dose. Do not switch from one form to another unless your doctor tells you to. · This medicine should come with a Medication Guide. Ask your pharmacist for a copy if you do not have one. · Missed dose:  
¨ Extended-release capsules or tablets: If you miss a dose, skip the missed dose and take your next dose at the usual time the next day. Do not double doses. ¨ Liquid: Take a dose as soon as you remember. If it is almost time for your next dose, wait until then and take a regular dose. Do not take extra medicine to make up for a missed dose. · Store the medicine in a closed container at room temperature, away from heat, moisture, and direct light. Store the medicine in a safe and secure place. Do not throw unused medicine in the trash. Ask your pharmacist about the best way to dispose of medicine you do not use. Drugs and Foods to Avoid: Ask your doctor or pharmacist before using any other medicine, including over-the-counter medicines, vitamins, and herbal products. · Some medicines can affect how hydromorphone works. Tell your doctor if you are using any of the following: ¨ A phenothiazine medicine ¨ An MAO inhibitor (MAOI) within the past 14 days · Tell your doctor if you use anything else that makes you sleepy. Some examples are allergy medicine, narcotic pain medicine, and alcohol. Tell your doctor if you are also using buprenorphine, butorphanol, nalbuphine, pentazocine, or a muscle relaxer. · Do not drink alcohol while you are using this medicine. Warnings While Using This Medicine: · Tell your doctor if you are pregnant or breastfeeding, or if you have kidney disease, liver disease, lung disease or breathing problems (such as asthma or COPD), low blood pressure, an underactive thyroid, [place] disease, cystic fibrosis, pancreas problems, gallbladder problems, an enlarged prostate, trouble urinating, or stomach or bowel problems. Tell your doctor if you have a history of head injury, brain tumor, seizures, depression, or alcohol or drug abuse. · This medicine may cause the following problems: 
¨ High risk of overdose, which can lead to death ¨ Respiratory depression (serious breathing problem that can be life-threatening) · This medicine can be habit-forming.  Do not use more than your prescribed dose. Call your doctor if you think your medicine is not working. · Do not stop using this medicine suddenly. Your doctor will need to slowly decrease your dose before you stop it completely. · This medicine may make you dizzy, drowsy, or lightheaded. Do not drive or do anything else that could be dangerous until you know how this medicine affects you. Sit or lie down if you feel dizzy. Stand up carefully. · This medicine may cause constipation, especially with long-term use. Ask your doctor if you should use a laxative to prevent and treat constipation. · Keep all medicine out of the reach of children. Never share your medicine with anyone. Possible Side Effects While Using This Medicine:  
Call your doctor right away if you notice any of these side effects: · Allergic reaction: Itching or hives, swelling in your face or hands, swelling or tingling in your mouth or throat, chest tightness, trouble breathing · Blue lips, fingernails, or skin · Extreme dizziness or weakness, shallow breathing, slow or uneven heartbeat, sweating, cold or clammy skin, seizures · Severe confusion, lightheadedness, dizziness, fainting · Severe constipation, stomach pain, or vomiting · Trouble breathing or slow breathing If you notice these less serious side effects, talk with your doctor: · Mild constipation, nausea, or vomiting · Mild sleepiness or tiredness If you notice other side effects that you think are caused by this medicine, tell your doctor. Call your doctor for medical advice about side effects. You may report side effects to FDA at 0-537-FDA-5948 © 2016 [address] is for End User's use only and may not be sold, redistributed or otherwise used for commercial purposes. The above information is an  only. It is not intended as medical advice for individual conditions or treatments.  Talk to your doctor, nurse or pharmacist before following any medical regimen to see if it is safe and effective for you. DISCHARGE SUMMARY from Nurse The following personal items are in your possession at time of discharge: 
 
Dental Appliances: None Visual Aid: Glasses Home Medications: None Jewelry: None Clothing: Pants, Shirt, Socks, Undergarments, Footwear Other Valuables: None PATIENT INSTRUCTIONS: 
 
 
F-face looks uneven A-arms unable to move or move unevenly S-speech slurred or non-existent T-time-call 911 as soon as signs and symptoms begin-DO NOT go Back to bed or wait to see if you get better-TIME IS BRAIN. Warning Signs of HEART ATTACK Call 911 if you have these symptoms: 
? Chest discomfort. Most heart attacks involve discomfort in the center of the chest that lasts more than a few minutes, or that goes away and comes back. It can feel like uncomfortable pressure, squeezing, fullness, or pain. ? Discomfort in other areas of the upper body. Symptoms can include pain or discomfort in one or both arms, the back, neck, jaw, or stomach. ? Shortness of breath with or without chest discomfort. ? Other signs may include breaking out in a cold sweat, nausea, or lightheadedness. Don't wait more than five minutes to call [address]! Fast action can save your life. Calling 911 is almost always the fastest way to get lifesaving treatment. Emergency Medical Services staff can begin treatment when they arrive  up to an hour sooner than if someone gets to the hospital by car. The discharge information has been reviewed with the patient and family. The patient and family verbalized understanding. Discharge medications reviewed with the patient and family and appropriate educational materials and side effects teaching were provided. Discharge Orders None General Information Please provide this summary of care documentation to your next provider. Patient Signature:  ____________________________________________________________ Date:  ____________________________________________________________  
  
[facility] Provider Signature:  ____________________________________________________________ Date:  ____________________________________________________________

## 2017-04-03 NOTE — ANESTHESIA POSTPROCEDURE EVALUATION
Post-Anesthesia Evaluation and Assessment    Patient: Vasile Gee MRN: [id number]  SSN: [id number]    YOB: 1932  Age: 80 y.o. Sex: female       Cardiovascular Function/Vital Signs  Visit Vitals    /57    Pulse 83    Temp 36.7 °C (98 °F)    Resp 14    Ht 4' 11\" (1.499 m)    Wt 68 kg (150 lb)    SpO2 97%    BMI 30.3 kg/m2       Patient is status post general anesthesia for Procedure(s):  CYSTOSCOPY/LEFT URETEROSCOPY /STENT PLACEMENT/LEFT RETROGRADE PYELOGRAM/UROTHELIAL MASS BIOPSY/C-ARM. Nausea/Vomiting: None    Postoperative hydration reviewed and adequate. Pain:  Pain Scale 1: Numeric (0 - 10) (04/03/17 0940)  Pain Intensity 1: 2 (04/03/17 0940)   Managed    Neurological Status:   Neuro (WDL): Within Defined Limits (04/03/17 0920)   At baseline    Mental Status and Level of Consciousness: Alert and oriented     Pulmonary Status:   O2 Device: Room air (04/03/17 0921)   Adequate oxygenation and airway patent    Complications related to anesthesia: None    Post-anesthesia assessment completed.  No concerns    Signed By: Dewane Gosselin, MD     April 3, 2017

## 2017-04-03 NOTE — BRIEF OP NOTE
BRIEF OPERATIVE NOTE    Date of Procedure: 4/3/2017   Preoperative Diagnosis: Renal mass [N28.89]  Renal stone [N20.0]  Postoperative Diagnosis: Renal mass [N28.89]  Renal stone [N20.0]    Procedure(s):  CYSTOSCOPY/LEFT URETEROSCOPY /STENT PLACEMENT/LEFT RETROGRADE PYELOGRAM/UROTHELIAL MASS BIOPSY/ STONE EXTRACTIOn  Surgeon(s) and Role:     * Noah Pham MD - Primary            Surgical Staff:  Grady-1: Greg Coe : Ade Cullen  Radiology Technician: Majel Essex Scrub Tech-1: Camryn Gonsales  Event Time In   Incision Start  8:02 AM   Incision Close  9:05 AM     Anesthesia: General   Estimated Blood Loss: 10ml  Specimens:   ID Type Source Tests Collected by Time Destination   1 : left renal pelvis biopsy Preservative Biopsy  Noah Pham MD 4/3/2017  8:27 AM Pathology   2 : left kidney stone Other STONES  Noah Pham MD 4/3/2017  8:49 AM Pathology      Findings: Renal pelvis tumor, with overlying blood clot, Lower pole stones   Complications: None  Implants:   Implant Name Type Inv.  Item Serial No.  Lot No. LRB No. Used Action   bard inlay optima  ureteral stent       BARD [id number] Left 1 Implanted

## 2017-04-03 NOTE — OP NOTES
[address]    Name:  Charo Borges  MR#:  [id number]  :  11/10/1932  Account #:  [de-identified]  Date of Adm:  2017  Date of Surgery:  2017      SURGEON: Leslye Kent MD    PREOPERATIVE DIAGNOSES  1. Hematuria. 2. Left renal pelvis tumor. 3. Left lower pole stone. POSTOPERATIVE DIAGNOSES  1. Hematuria. 2. Left renal pelvis tumor. 3. Left lower pole stone. PROCEDURES PERFORMED  1. Cystoscopy. 2. Left retrograde pyelogram.  3. Left renal mass biopsy. 4. Basket stone extraction. 5. Stent placement. 6. Fluoroscopy with interpretation, 1 hour. ANESTHESIA: General.    ANTIBIOTICS: Cipro. DRAIN: A 6 x 24-Bangl[place]hi double-J stent. SPECIMENS REMOVED  1. Left renal pelvis mass (biopsy). 2. Left lower pole stone, renal.    ESTIMATED BLOOD LOSS: 10 mL. COMPLICATIONS: None. FINDINGS  1. 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 2. Lower pole stones x3, all removed. DISPOSITION: PACU and home. INDICATIONS: This is an 80-year-old lady with multiple cancers and  strong family history of cancer, who presents with gross hematuria  on workup. Office cystoscopy was normal, but CT urogram revealed a  renal pelvis mass extending into the lower pole, along with a 5 mm  lower pole stone. Decision was made to biopsy the lesion and  determine the course of action. Risks, benefits and alternatives were  discussed with the patient, and she wishes to proceed. DESCRIPTION OF PROCEDURE: Consent was obtained. She was  brought into the operating room and placed in supine position. Anesthesia was administered. She received a dose of intravenous  antibiotics. She was then put in dorsal lithotomy position after all  pressure points were properly padded. She was prepped and draped  in normal sterile fashion. A time-out was conducted to verify laterality after verifying the imaging  studies.  Using a 21-Bangl[place]hi sheath and a 30-degree lens scope,  performed the cystourethroscopy. The bladder appeared to  have normal ureteral orifices with normal position. There was grade 3  trabeculation. I was able to cannulate the left ureteral orifice with a 5-  [place] open-ended catheter and perform a retrograde pyelogram.  There was no radiopaque calculus appreciated. There was no  hydronephrosis. There was no appreciable filling defect in the renal  pelvis. I passed a sensor wire up into the kidney. We performed a  semi-rigid ureteroscopy up to the proximal ureter and there was no  lesion in the ureter. I left a second sensor wire in place and then  placed an 11 x 13 ureteral access sheath over 1 of the wires up to the  proximal ureter. Using a flexible ureteroscope, I systematically  evaluated the kidney. We encountered several renal pelvis tumors, the  largest measuring 3-5 cm, sessile in appearance. The upper pole and  the mid pole calices were clear. The lower pole joão had several  stones. I then introduced a Pir[place]ha flexible biopsy forceps and took several  biopsies of this renal pelvis mass. We sent it in formalin. Attention was  turned to lower pole and we removed the stones using a 1.9 Nitinol  basket. Hemostasis was obtained. Left a 6 x 24-[place] double-J stent  with a proximal curl in the renal pelvis and distal curl in the bladder. Bladder was emptied. The patient was awakened from anesthesia and  transferred to PACU in stable condition.         MD ELIS Chou / TANA  D:  04/03/2017   09:27  T:  04/03/2017   10:30  Job #:  [id number]

## 2017-04-08 LAB
CA PHOS MFR STONE: 50 %
CALCIUM OXALATE DIHYDRATE MFR STONE IR: 20 %
COLOR STONE: NORMAL
COM MFR STONE: 30 %
COMMENT, 519994: NORMAL
COMPOSITION, 120440: NORMAL
DISCLAIMER, STO32L: NORMAL
NIDUS STONE QL: NORMAL
SIZE STONE: NORMAL MM
SURFACE CRYSTALS, 120439: NORMAL
WT STONE: 26 MG

## 2024-02-09 ENCOUNTER — HOSPITAL ENCOUNTER (OUTPATIENT)
Facility: HOSPITAL | Age: 89
End: 2024-02-09
Attending: INTERNAL MEDICINE
Payer: MEDICARE

## 2024-02-09 DIAGNOSIS — N18.32 STAGE 3B CHRONIC KIDNEY DISEASE (HCC): ICD-10-CM

## 2024-02-09 PROCEDURE — 76770 US EXAM ABDO BACK WALL COMP: CPT

## (undated) DEVICE — SOLUTION IRRIG 3000ML 0.9% SOD CHL FLX CONT 0797208] ICU MEDICAL INC]

## (undated) DEVICE — 3M™ BAIR PAWS FLEX™ WARMING GOWN, STANDARD, 20 PER CASE 81003: Brand: BAIR PAWS™

## (undated) DEVICE — (D)LUB GEL SURG SURGLUB 2OZ -- DISC BY MFR USE ITEM 292507

## (undated) DEVICE — BAG DRAINAGE CUST DISP

## (undated) DEVICE — TUBING IRRIG L77IN DIA0.241IN L BOR FOR CYSTO W/ NVENT

## (undated) DEVICE — DRAPE TWL SURG 16X26IN BLU ORB04] ALLCARE INC]

## (undated) DEVICE — GAUZE SPONGES,16 PLY: Brand: CURITY

## (undated) DEVICE — GDWIRE 3CM FLX-TIP 0.038X150CM -- BX/5 SENSOR

## (undated) DEVICE — FLEX ADVANTAGE 3000CC: Brand: FLEX ADVANTAGE

## (undated) DEVICE — URETEROSCOPIC BIOPSY FORCEPS: Brand: PIRANHA

## (undated) DEVICE — NITINOL STONE RETRIEVAL BASKET: Brand: ZERO TIP

## (undated) DEVICE — OPEN-END FLEXI-TIP URETERAL CATHETER: Brand: FLEXI-TIP

## (undated) DEVICE — SEAL INSTR CYSTO ADJ BX PRT SEAL FOR ACC UP TO 6FR

## (undated) DEVICE — Z DUP USE 2565107 PACK SURG PROC LEG CYSTO T-DRAPE REINF TBL CVR HND TWL

## (undated) DEVICE — SOLUTION IRRIG 1000ML H2O STRL BLT

## (undated) DEVICE — NEEDLE HYPO 30GA L1IN BGE POLYPR HUB S STL REG BVL STR W/O

## (undated) DEVICE — KIT CLN UP BON SECOURS MARYV

## (undated) DEVICE — KENDALL SCD EXPRESS SLEEVES, KNEE LENGTH, MEDIUM: Brand: KENDALL SCD

## (undated) DEVICE — TRAY PREP DRY W/ PREM GLV 2 APPL 6 SPNG 2 UNDPD 1 OVERWRAP

## (undated) DEVICE — GOWN,PREVENTION PLUS,XLN/XL,ST,24/CS: Brand: MEDLINE

## (undated) DEVICE — STER SINGLE BASIN SET W/BOWLS: Brand: CARDINAL HEALTH

## (undated) DEVICE — URETERAL ACCESS SHEATH SET: Brand: NAVIGATOR

## (undated) DEVICE — (D)SYR 10ML 1/5ML GRAD NSAF -- PKGING CHANGE USE ITEM 338027